# Patient Record
Sex: MALE | Race: WHITE | NOT HISPANIC OR LATINO | Employment: OTHER | URBAN - METROPOLITAN AREA
[De-identification: names, ages, dates, MRNs, and addresses within clinical notes are randomized per-mention and may not be internally consistent; named-entity substitution may affect disease eponyms.]

---

## 2017-01-04 ENCOUNTER — OFFICE VISIT (OUTPATIENT)
Dept: SURGERY | Facility: CLINIC | Age: 82
End: 2017-01-04

## 2017-01-04 VITALS
BODY MASS INDEX: 32.14 KG/M2 | WEIGHT: 200 LBS | DIASTOLIC BLOOD PRESSURE: 82 MMHG | HEIGHT: 66 IN | SYSTOLIC BLOOD PRESSURE: 132 MMHG

## 2017-01-04 DIAGNOSIS — C18.2 MALIGNANT NEOPLASM OF ASCENDING COLON (HCC): Primary | ICD-10-CM

## 2017-01-04 PROCEDURE — 99212 OFFICE O/P EST SF 10 MIN: CPT | Performed by: SURGERY

## 2017-01-04 NOTE — PROGRESS NOTES
8 MO FU COLON CA, 3 1/2 YEARS S/P COLECTOMY, CEA COMPLETE, PT IS W/O COMPLAINTS  Complaints.  He says that his bowel movements have been normal he denies any blood in the stool.  His weight has been stable.  He denies any respiratory complaints or any abdominal pain.  He is due for colonoscopy with Dr. Palomares in 6 months.  Physical exam is alert white male in no active distress she is obese his abdominal exam is soft and nontender without mass.  His liver was nonpalpable.  He has no supraclavicular adenopathy his lungs are clear and equal.  His CEA is elevated in the 3 range but it has stayed fairly steady in that range.  I would like to check another CEA in 6 months and see him back after his colonoscopy with Dr. Palomares.

## 2017-07-24 DIAGNOSIS — Z85.038 PERSONAL HISTORY OF COLON CANCER: Primary | ICD-10-CM

## 2017-08-09 ENCOUNTER — LAB (OUTPATIENT)
Dept: LAB | Facility: HOSPITAL | Age: 82
End: 2017-08-09
Attending: SURGERY

## 2017-08-09 DIAGNOSIS — Z85.038 PERSONAL HISTORY OF COLON CANCER: ICD-10-CM

## 2017-08-09 LAB — CEA SERPL-MCNC: 3.02 NG/ML

## 2017-08-09 PROCEDURE — 36415 COLL VENOUS BLD VENIPUNCTURE: CPT

## 2017-08-09 PROCEDURE — 82378 CARCINOEMBRYONIC ANTIGEN: CPT

## 2017-08-14 ENCOUNTER — OFFICE VISIT (OUTPATIENT)
Dept: SURGERY | Facility: CLINIC | Age: 82
End: 2017-08-14

## 2017-08-14 VITALS
HEIGHT: 66 IN | WEIGHT: 202.2 LBS | DIASTOLIC BLOOD PRESSURE: 84 MMHG | BODY MASS INDEX: 32.5 KG/M2 | SYSTOLIC BLOOD PRESSURE: 132 MMHG

## 2017-08-14 DIAGNOSIS — C18.9 MALIGNANT NEOPLASM OF COLON, UNSPECIFIED PART OF COLON (HCC): Primary | ICD-10-CM

## 2017-08-14 DIAGNOSIS — K40.90 UNILATERAL INGUINAL HERNIA WITHOUT OBSTRUCTION OR GANGRENE, RECURRENCE NOT SPECIFIED: ICD-10-CM

## 2017-08-14 PROCEDURE — 99212 OFFICE O/P EST SF 10 MIN: CPT | Performed by: SURGERY

## 2017-08-14 NOTE — PROGRESS NOTES
1 yr 1 mo f/u on colon cancer  He is 4 years status post colectomy for colon cancer.  He denies any blood in the stool.  He says that he has been checked with cards and that has always been negative.  His weight has been stable.  He denies a cough.  He denies any abdominal pain.  He denies any groin pain.  He did have a CT scan a year ago that showed a small left inguinal hernia.  His abdominal exam is benign and there is no mass.  His liver is nonpalpable.  He has no supraclavicular adenopathy.  I discussed the CT scan findings with him regarding the inguinal hernia.  I discussed symptoms of inguinal hernia with him in detail.  We will observe this.  He is due to have a colonoscopy in September and  asked that those results be sent to me.  His CEA is stable but still elevated in the 3 range and we will recheck that in 6 months  see him back in the office.

## 2017-09-14 ENCOUNTER — ANESTHESIA EVENT (OUTPATIENT)
Dept: PERIOP | Facility: HOSPITAL | Age: 82
End: 2017-09-14

## 2017-09-15 ENCOUNTER — ANESTHESIA (OUTPATIENT)
Dept: PERIOP | Facility: HOSPITAL | Age: 82
End: 2017-09-15

## 2017-09-15 ENCOUNTER — ON CAMPUS - OUTPATIENT (OUTPATIENT)
Dept: URBAN - METROPOLITAN AREA HOSPITAL 28 | Facility: HOSPITAL | Age: 82
End: 2017-09-15

## 2017-09-15 ENCOUNTER — HOSPITAL ENCOUNTER (OUTPATIENT)
Facility: HOSPITAL | Age: 82
Setting detail: HOSPITAL OUTPATIENT SURGERY
Discharge: HOME | End: 2017-09-15
Attending: INTERNAL MEDICINE | Admitting: INTERNAL MEDICINE

## 2017-09-15 ENCOUNTER — PREP FOR SURGERY (OUTPATIENT)
Dept: OTHER | Facility: HOSPITAL | Age: 82
End: 2017-09-15

## 2017-09-15 VITALS
HEART RATE: 56 BPM | TEMPERATURE: 97.4 F | SYSTOLIC BLOOD PRESSURE: 144 MMHG | WEIGHT: 196.8 LBS | OXYGEN SATURATION: 95 % | DIASTOLIC BLOOD PRESSURE: 72 MMHG | HEIGHT: 66 IN | RESPIRATION RATE: 16 BRPM | BODY MASS INDEX: 31.63 KG/M2

## 2017-09-15 DIAGNOSIS — Z85.038 PERSONAL HISTORY OF COLON CANCER: ICD-10-CM

## 2017-09-15 DIAGNOSIS — Z90.49 ACQUIRED ABSENCE OF OTHER SPECIFIED PARTS OF DIGESTIVE TRACT: ICD-10-CM

## 2017-09-15 DIAGNOSIS — Z85.038 PERSONAL HISTORY OF OTHER MALIGNANT NEOPLASM OF LARGE INTEST: ICD-10-CM

## 2017-09-15 DIAGNOSIS — Z98.890 OTHER SPECIFIED POSTPROCEDURAL STATES: ICD-10-CM

## 2017-09-15 DIAGNOSIS — K63.5 POLYP OF COLON: ICD-10-CM

## 2017-09-15 DIAGNOSIS — K62.1 RECTAL POLYP: ICD-10-CM

## 2017-09-15 LAB
GLUCOSE BLDC GLUCOMTR-MCNC: 87 MG/DL (ref 70–130)
POTASSIUM BLD-SCNC: 4.6 MMOL/L (ref 3.5–5.2)

## 2017-09-15 PROCEDURE — 84132 ASSAY OF SERUM POTASSIUM: CPT | Performed by: NURSE ANESTHETIST, CERTIFIED REGISTERED

## 2017-09-15 PROCEDURE — 82962 GLUCOSE BLOOD TEST: CPT

## 2017-09-15 PROCEDURE — 45380 COLONOSCOPY AND BIOPSY: CPT | Mod: PT

## 2017-09-15 PROCEDURE — 93010 ELECTROCARDIOGRAM REPORT: CPT | Performed by: INTERNAL MEDICINE

## 2017-09-15 PROCEDURE — 25010000002 PROPOFOL 10 MG/ML EMULSION: Performed by: NURSE ANESTHETIST, CERTIFIED REGISTERED

## 2017-09-15 PROCEDURE — 93005 ELECTROCARDIOGRAM TRACING: CPT | Performed by: NURSE ANESTHETIST, CERTIFIED REGISTERED

## 2017-09-15 RX ORDER — SODIUM CHLORIDE 0.9 % (FLUSH) 0.9 %
1-10 SYRINGE (ML) INJECTION AS NEEDED
Status: DISCONTINUED | OUTPATIENT
Start: 2017-09-15 | End: 2017-09-15 | Stop reason: HOSPADM

## 2017-09-15 RX ORDER — PROPOFOL 10 MG/ML
VIAL (ML) INTRAVENOUS AS NEEDED
Status: DISCONTINUED | OUTPATIENT
Start: 2017-09-15 | End: 2017-09-15 | Stop reason: SURG

## 2017-09-15 RX ORDER — SODIUM CHLORIDE, SODIUM LACTATE, POTASSIUM CHLORIDE, CALCIUM CHLORIDE 600; 310; 30; 20 MG/100ML; MG/100ML; MG/100ML; MG/100ML
100 INJECTION, SOLUTION INTRAVENOUS CONTINUOUS
Status: CANCELLED | OUTPATIENT
Start: 2017-09-15

## 2017-09-15 RX ORDER — ONDANSETRON 2 MG/ML
4 INJECTION INTRAMUSCULAR; INTRAVENOUS ONCE AS NEEDED
Status: CANCELLED | OUTPATIENT
Start: 2017-09-15

## 2017-09-15 RX ORDER — LIDOCAINE HYDROCHLORIDE 20 MG/ML
INJECTION, SOLUTION INFILTRATION; PERINEURAL AS NEEDED
Status: DISCONTINUED | OUTPATIENT
Start: 2017-09-15 | End: 2017-09-15 | Stop reason: SURG

## 2017-09-15 RX ORDER — LIDOCAINE HYDROCHLORIDE 10 MG/ML
0.5 INJECTION, SOLUTION EPIDURAL; INFILTRATION; INTRACAUDAL; PERINEURAL ONCE AS NEEDED
Status: DISCONTINUED | OUTPATIENT
Start: 2017-09-15 | End: 2017-09-15 | Stop reason: HOSPADM

## 2017-09-15 RX ORDER — SODIUM CHLORIDE, SODIUM LACTATE, POTASSIUM CHLORIDE, CALCIUM CHLORIDE 600; 310; 30; 20 MG/100ML; MG/100ML; MG/100ML; MG/100ML
9 INJECTION, SOLUTION INTRAVENOUS CONTINUOUS
Status: DISCONTINUED | OUTPATIENT
Start: 2017-09-15 | End: 2017-09-15 | Stop reason: HOSPADM

## 2017-09-15 RX ADMIN — SODIUM CHLORIDE, POTASSIUM CHLORIDE, SODIUM LACTATE AND CALCIUM CHLORIDE: 600; 310; 30; 20 INJECTION, SOLUTION INTRAVENOUS at 11:52

## 2017-09-15 RX ADMIN — LIDOCAINE HYDROCHLORIDE 100 MG: 20 INJECTION, SOLUTION INFILTRATION; PERINEURAL at 12:11

## 2017-09-15 RX ADMIN — PROPOFOL 180 MG: 10 INJECTION, EMULSION INTRAVENOUS at 12:11

## 2017-09-15 NOTE — H&P
"Patient Care Team:  Clem Zabala MD as PCP - General (Family Medicine)    CHIEF COMPLAINT: Previous CRC    HISTORY OF PRESENT ILLNESS:    Last exam was 2014 and resection was 2013      Past Medical History:   Diagnosis Date   • Arthritis    • Cancer     COLON CANCER   • Diabetes mellitus    • Hyperlipidemia    • Hypertension      Past Surgical History:   Procedure Laterality Date   • CHOLECYSTECTOMY     • COLONOSCOPY     • HEMICOLECTOMY     • SPINAL FUSION       History reviewed. No pertinent family history.  Social History   Substance Use Topics   • Smoking status: Former Smoker   • Smokeless tobacco: Never Used   • Alcohol use Yes      Comment: rarely     Prescriptions Prior to Admission   Medication Sig Dispense Refill Last Dose   • Multiple Vitamins-Minerals (MULTIVITAMIN ADULT PO) Take 1 tablet by mouth Daily.   9/10/2017   • aspirin tablet Take 81 mg by mouth Daily.   9/10/2017   • lisinopril-hydrochlorothiazide (PRINZIDE,ZESTORETIC) 10-12.5 MG per tablet Take 1 tablet by mouth Daily.   9/10/2017   • metFORMIN XR (GLUCOPHAGE-XR) 750 MG 24 hr tablet TAKE 1 TABLET BY MOUTH ONCE DAILY BEFORE SUPPER  0 9/10/2017   • pravastatin (PRAVACHOL) 40 MG tablet Take 40 mg by mouth Daily.   9/10/2017   • tamsulosin (FLOMAX) 0.4 MG capsule 24 hr capsule Take 1 capsule by mouth Daily.   9/10/2017     Allergies:  Review of patient's allergies indicates no known allergies.    REVIEW OF SYSTEMS:  Please see the above history of present illness for pertinent positives and negatives.  The remainder of the patient's systems have been reviewed and are negative.     Vital Signs  Temp:  [97.9 °F (36.6 °C)] 97.9 °F (36.6 °C)  Heart Rate:  [66] 66  Resp:  [16] 16  BP: (164)/(82) 164/82    Flowsheet Rows         First Filed Value    Admission Height  66\" (167.6 cm) Documented at 09/15/2017 1052    Admission Weight  196 lb 12.8 oz (89.3 kg) Documented at 09/15/2017 1052           Physical Exam:  Physical Exam "   Constitutional: Patient appears well-developed and well-nourished and in no acute distress   HEENT:   Head: Normocephalic and atraumatic.   Eyes:  Pupils are equal, round, and reactive to light. EOM are intact. Sclera are anicteric and non-injected.  Mouth and Throat: Patient has moist mucous membranes. Oropharynx is clear of any erythema or exudate.     Neck: Neck supple. No JVD present. No thyromegaly present. No lymphadenopathy present.  Cardiovascular: Regular rate, regular rhythm, S1 normal and S2 normal.  Exam reveals no gallop and no friction rub.  No murmur heard.  Pulmonary/Chest: Lungs are clear to auscultation bilaterally. No respiratory distress. No wheezes. No rhonchi. No rales.   Abdominal: Soft. Bowel sounds are normal. No distension and no mass. There is no hepatosplenomegaly. There is no tenderness.   Musculoskeletal: Normal Muscle tone  Extremities: No edema. Pulses are palpable in all 4 extremities.  Neurological: Patient is alert and oriented to person, place, and time. Cranial nerves II-XII are grossly intact with no focal deficits.  Skin: Skin is warm. No rash noted. Nails show no clubbing.  No cyanosis or erythema.     Results Review:    I reviewed the patient's new clinical results.  Lab Results (most recent)     Procedure Component Value Units Date/Time    POC Glucose Fingerstick [[id number]]  (Normal) Collected:  09/15/17 1105    Specimen:  Blood Updated:  09/15/17 1121     Glucose 87 mg/dL     Narrative:       Meter: [id number] : [id number] Heath Valdez RN    Potassium [[id number]]  (Normal) Collected:  09/15/17 1115    Specimen:  Blood from Arm, Right Updated:  09/15/17 1138     Potassium 4.6 mmol/L           Imaging Results (most recent)     None        reviewed    ECG/EMG Results (most recent)     Procedure Component Value Units Date/Time    ECG 12 Lead [[id number]] Collected:  09/15/17 1133     Updated:  09/15/17 1136    Narrative:       RR Interval= 968 ms  [place] Interval= 184  ms  QRSD Interval= 92 ms  QT Interval= 408 ms  QTc Interval= 415 ms  Heart Rate= 62 ms  P Axis= 0 deg  QRS Axis= -47 deg  T Wave Axis= 35 deg  I: 40 Axis= -41 deg  T: 40 Axis= -58 deg  ST Axis= -11 deg  SINUS RHYTHM  LAD, CONSIDER LAFB OR INFERIOR INFARCT  CONSIDER ANTEROSEPTAL INFARCT  Electronically Signed by:  Date and Time of Study: 2017-09-15 11:33:17        reviewed    Assessment/Plan     Personal history of CRC/ Colonoscopy    I discussed the patients findings and my recommendations with patient.     Tenzin Palomares MD  09/15/17  12:03 PM    Time: 10 min prior to procedure.

## 2017-09-15 NOTE — ANESTHESIA POSTPROCEDURE EVALUATION
Patient: Chip Hartley    Procedure Summary     Date Anesthesia Start Anesthesia Stop Room / Location    09/15/17 1204 1234 [facility] / [facility]       Procedure Diagnosis Surgeon Provider    COLONOSCOPY with polypectomy (N/A ) Colon polyps; S/P right hemicolectomy  (Z85.038) MD Taya Vasquez CRNA          Anesthesia Type: MAC  Last vitals  BP   146/72 (09/15/17 1237)    Temp   97.4 °F (36.3 °C) (09/15/17 1237)    Pulse   56 (09/15/17 1237)   Resp   16 (09/15/17 1237)    SpO2   96 % (09/15/17 1237)      Post Anesthesia Care and Evaluation    Patient location during evaluation: PHASE II  Patient participation: complete - patient participated  Level of consciousness: awake and alert  Pain score: 0  Pain management: adequate  Airway patency: patent  Anesthetic complications: No anesthetic complications  PONV Status: none  Cardiovascular status: acceptable  Respiratory status: acceptable  Hydration status: acceptable

## 2017-09-15 NOTE — OP NOTE
COLONOSCOPY  Procedure Report    Patient Name:  Chip Hartley  YOB: 1935    Date of Surgery:  9/15/2017     Indications:  Personal history of colon cancer    Pre-op Diagnosis:  Z85.038           Post-op Diagnosis:   Post-Op Diagnosis Codes:     * Colon polyps [K63.5]     * S/P right hemicolectomy [Z90.49]         Procedure/CPT® Codes:      Procedure(s):  COLONOSCOPY with polypectomy    Staff:  Surgeon(s):  Tenzin Palomares MD         Anesthesia: Monitor Anesthesia Care    Estimated Blood Loss: *No blood loss documented*    Implants:    Nothing was implanted during the procedure    Specimen:                  ID Type Source Tests Collected by Time Destination   A : cecal polyp Polyp Large Intestine, Cecum TISSUE EXAM Tenzin Palomares MD 9/15/2017 1220    B : rectal polyp x2 Tissue Large Intestine, Rectum TISSUE EXAM Tenzin Palomares MD 9/15/2017 1228        Description of Procedure: After having signed informed consent, he was brought to the endoscopy suite and placed in the left lateral decubitus position and given his IV sedation. Rectal exam revealed no external lesions, normal anal tone, and only mildly enlarged prostate without nodules. The scope was introduced in the rectum and advanced under direct visualization through a fairly well-prepped colon, through the rectum, sigmoid colon, descending colon, to and around the splenic flexure, reduced, and advanced through the transverse colon to the ileocolonic anastomosis. Within the surgically created cecum there was a granulation polyp associated with a staple. This was biopsied. It was 3 mm in size. It was successfully removed along with the clip. The scope was withdrawn from the ileocolonic anastomosis to the blind pouch, and the small bowel was entered and examined and normal appearing. The scope was then brought back into the afferent limb of the small bowel, which was normal as well. The scope was withdrawn back  "into the colon, withdrawn slowly to examine the colon in a circumferential fashion. There were no mucosal lesions noted throughout the remaining colon, with the exception of two 3-mm hyperplastic polyps noted in the rectum. They were both biopsied and sent together as rectal polyps x2. The scope was taken from the patient, who tolerated the procedure very well.           Findings:  [place] to Ileo-colonic Anastamosis  Polyps 3 \"cecal\" with staple-cold biopsy; Rectal x 2 3mm cold biopsy      Complications: none    Recommendations: Results to be called  Repeat in 5 years      Tenzin Palomares MD     Date: 9/15/2017  Time: 12:34 PM          "

## 2017-09-15 NOTE — PLAN OF CARE
Problem: Patient Care Overview (Adult)  Goal: Plan of Care Review  Outcome: Ongoing (interventions implemented as appropriate)    09/15/17 1126   Coping/Psychosocial Response Interventions   Plan Of Care Reviewed With patient;spouse   Patient Care Overview   Progress no change   Outcome Evaluation   Outcome Summary/Follow up Plan VSS, NO C/O, READY FOR PROCEDURE       Goal: Adult Individualization and Mutuality  Outcome: Ongoing (interventions implemented as appropriate)    Problem: GI Endoscopy (Adult)  Goal: Signs and Symptoms of Listed Potential Problems Will be Absent or Manageable (GI Endoscopy)  Outcome: Ongoing (interventions implemented as appropriate)

## 2017-09-15 NOTE — BRIEF OP NOTE
"COLONOSCOPY  Procedure Note    Chip Hartley  9/15/2017    Pre-op Diagnosis:   Z85.038    Post-op Diagnosis:     Post-Op Diagnosis Codes:     * Colon polyps [K63.5]     * S/P right hemicolectomy [Z90.49]    Procedure/CPT® Codes:      Procedure(s):  COLONOSCOPY with polypectomy    Surgeon(s):  Tenzin Palomares MD    Anesthesia: Monitor Anesthesia Care    Staff:   Circulator: Maya Bruce RN  Scrub Person: Sonia Krishna    Estimated Blood Loss: *No blood loss documented*  Urine Voided: * No values recorded between 9/15/2017 12:01 PM and 9/15/2017 12:30 PM *    Specimens:                  ID Type Source Tests Collected by Time Destination   A : cecal polyp Polyp Large Intestine, Cecum TISSUE EXAM Tenzin Palomares MD 9/15/2017 1220    B : rectal polyp x2 Tissue Large Intestine, Rectum TISSUE EXAM Tenzin Palomares MD 9/15/2017 1228          Drains:           Findings: [place] to Ileo-colonic Anastamosis  Polyps 3 \"cecal\" with staple-cold biopsy; Rectal x 2 3mm cold biopsy    Complications: none      Tenzin Palomares MD     Date: 9/15/2017  Time: 12:33 PM      "

## 2017-09-15 NOTE — ANESTHESIA PREPROCEDURE EVALUATION
Anesthesia Evaluation     Patient summary reviewed and Nursing notes reviewed   history of anesthetic complications:  NPO Solid Status: > 8 hours  NPO Liquid Status: > 8 hours     Airway   Mallampati: III  Neck ROM: full  possible difficult intubation and small opening  Dental - normal exam     Pulmonary - negative pulmonary ROS    breath sounds clear to auscultation  Cardiovascular - normal exam  Exercise tolerance: poor (<4 METS)    ECG reviewed  Rhythm: regular  Rate: normal    (+) hypertension well controlled, hyperlipidemia    ROS comment: SINUS RHYTHM  LAD, CONSIDER LAFB OR INFERIOR INFARCT  CONSIDER ANTEROSEPTAL INFARCT    Neuro/Psych    ROS Comment: [place]  GI/Hepatic/Renal/Endo    (+) obesity,  diabetes mellitus type 2 well controlled,     Musculoskeletal     Abdominal   (+) obese,    Substance History   (+) alcohol use (rarely),      OB/GYN          Other   (+) arthritis   history of cancer (colon)                                  Anesthesia Plan    ASA 2     MAC     Anesthetic plan and risks discussed with patient.  Use of blood products discussed with patient  Consented to blood products.

## 2017-09-15 NOTE — PLAN OF CARE
Problem: GI Endoscopy (Adult)  Goal: Signs and Symptoms of Listed Potential Problems Will be Absent or Manageable (GI Endoscopy)  Outcome: Ongoing (interventions implemented as appropriate)    09/15/17 1210   GI Endoscopy   Problems Assessed (GI Endoscopy) all   Problems Present (GI Endoscopy) none

## 2017-09-19 LAB
LAB AP CASE REPORT: NORMAL
Lab: NORMAL
PATH REPORT.FINAL DX SPEC: NORMAL

## 2018-01-09 DIAGNOSIS — Z85.038 HISTORY OF COLON CANCER: Primary | ICD-10-CM

## 2018-01-26 ENCOUNTER — LAB (OUTPATIENT)
Dept: LAB | Facility: HOSPITAL | Age: 83
End: 2018-01-26
Attending: SURGERY

## 2018-01-26 DIAGNOSIS — Z85.038 HISTORY OF COLON CANCER: ICD-10-CM

## 2018-01-26 LAB — CEA SERPL-MCNC: 3.2 NG/ML

## 2018-01-26 PROCEDURE — 36415 COLL VENOUS BLD VENIPUNCTURE: CPT

## 2018-01-26 PROCEDURE — 82378 CARCINOEMBRYONIC ANTIGEN: CPT

## 2018-01-29 ENCOUNTER — OFFICE VISIT (OUTPATIENT)
Dept: SURGERY | Facility: CLINIC | Age: 83
End: 2018-01-29

## 2018-01-29 VITALS
DIASTOLIC BLOOD PRESSURE: 80 MMHG | HEIGHT: 66 IN | SYSTOLIC BLOOD PRESSURE: 148 MMHG | BODY MASS INDEX: 33.11 KG/M2 | WEIGHT: 206 LBS

## 2018-01-29 DIAGNOSIS — C18.2 MALIGNANT NEOPLASM OF ASCENDING COLON (HCC): Primary | ICD-10-CM

## 2018-01-29 PROCEDURE — 99212 OFFICE O/P EST SF 10 MIN: CPT | Performed by: SURGERY

## 2018-01-29 NOTE — PROGRESS NOTES
8 MO F/U COLON CA, ~ 4 1/2 years status post colectomy for colon cancer, PT IS W/O COMPLAINTS  He is without complaints.  He has not lost any weight.  He denies a cough.  Denies any abdominal pain or blood in stool.  He had a colonoscopy by Dr. Palomares several months ago that showed several hyperplastic polyps.  He have a repeat colonoscopy in 5 years.  His CEA level is still does mildly elevated but not significantly different over the last year and a half.  He has no supraclavicular or groin adenopathy.  His lungs are clear and equal.  His heart shows a regular rate and rhythm.  His abdominal exam is benign.  There is no incisional hernia.  I would like to see him back in 1 year and I don't feel like we need to repeat his CEA because it hasn't significantly changed.

## 2019-01-15 ENCOUNTER — OFFICE VISIT (OUTPATIENT)
Dept: SURGERY | Facility: CLINIC | Age: 84
End: 2019-01-15

## 2019-01-15 VITALS
RESPIRATION RATE: 18 BRPM | WEIGHT: 201 LBS | BODY MASS INDEX: 32.3 KG/M2 | HEIGHT: 66 IN | SYSTOLIC BLOOD PRESSURE: 132 MMHG | DIASTOLIC BLOOD PRESSURE: 74 MMHG

## 2019-01-15 DIAGNOSIS — C18.2 MALIGNANT NEOPLASM OF ASCENDING COLON (HCC): ICD-10-CM

## 2019-01-15 DIAGNOSIS — Z85.038 HISTORY OF COLON CANCER: Primary | ICD-10-CM

## 2019-01-15 PROCEDURE — 99212 OFFICE O/P EST SF 10 MIN: CPT | Performed by: SURGERY

## 2019-01-15 NOTE — PROGRESS NOTES
FU - 5 years status post colectomy for colon cancer, PT IS W/O COMPLAINTS  Is 5 and half years status post colectomy for colon cancer.  Says he feels well.  He did have a colonoscopy that showed some benign polyps a year and a half ago.  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  He denies any abdominal pain says he has the usual cough with allergies but nothing chronic.  His weight has been stable.  He denies any abdominal pain.  Alert overweight white male in no active distress he is no supraclavicular nor groin adenopathy.  His lungs are clear and equal was heart shows a regular rate and rhythm his abdomen soft and nontender without mass or impulse.  I see no evidence of recurrent disease I would like to check stool for Hemoccult ×3 and I will see him back in 1 year

## 2019-08-22 ENCOUNTER — TRANSCRIBE ORDERS (OUTPATIENT)
Dept: ADMINISTRATIVE | Facility: HOSPITAL | Age: 84
End: 2019-08-22

## 2019-08-22 DIAGNOSIS — I71.21 ANEURYSM OF ASCENDING AORTA (HCC): Primary | ICD-10-CM

## 2019-08-28 ENCOUNTER — HOSPITAL ENCOUNTER (OUTPATIENT)
Dept: CT IMAGING | Facility: HOSPITAL | Age: 84
Discharge: HOME | End: 2019-08-28
Admitting: FAMILY MEDICINE

## 2019-08-28 DIAGNOSIS — I71.21 ANEURYSM OF ASCENDING AORTA (HCC): ICD-10-CM

## 2019-08-28 PROCEDURE — 71250 CT THORAX DX C-: CPT

## 2020-01-21 ENCOUNTER — OFFICE VISIT (OUTPATIENT)
Dept: SURGERY | Facility: CLINIC | Age: 85
End: 2020-01-21

## 2020-01-21 VITALS
WEIGHT: 193 LBS | DIASTOLIC BLOOD PRESSURE: 76 MMHG | SYSTOLIC BLOOD PRESSURE: 132 MMHG | HEIGHT: 66 IN | BODY MASS INDEX: 31.02 KG/M2 | RESPIRATION RATE: 18 BRPM

## 2020-01-21 DIAGNOSIS — Z85.038 HISTORY OF COLON CANCER: Primary | ICD-10-CM

## 2020-01-21 PROCEDURE — 99212 OFFICE O/P EST SF 10 MIN: CPT | Performed by: SURGERY

## 2020-01-21 NOTE — PROGRESS NOTES
Subjective   Chip Hartley is a 84 y.o. male here for   Chief Complaint   Patient presents with   • Follow-up   FU - 6 yr s/p colectomy for colon cancer    History of Present Illness he is 6 years status post colectomy for colon cancer.  He denies any problems.  He says his weight is stable he denies any blood in the stool or changes in his bowel movements.  He denies any abdominal pain.  He only has a cough during allergy season.  He had a colonoscopy with Dr. Palomares who removed several benign polyps 2 years ago.  Another colonoscopy was recommended in 5 years from the last study.    The following portions of the patient's history were reviewed and updated as appropriate: allergies, current medications, past family history, past medical history, past social history, past surgical history and problem list.    Past Medical History:   Diagnosis Date   • Arthritis    • Cancer (CMS/HCC)     COLON CANCER   • Diabetes mellitus (CMS/HCC)    • Hyperlipidemia    • Hypertension        Past Surgical History:   Procedure Laterality Date   • CHOLECYSTECTOMY     • COLONOSCOPY     • COLONOSCOPY N/A 9/15/2017    Procedure: COLONOSCOPY with polypectomy;  Surgeon: Tenzin Palomares MD;  Location: [facility];  Service:    • HEMICOLECTOMY     • SPINAL FUSION         History reviewed. No pertinent family history.    Social History     Socioeconomic History   • Marital status:      Spouse name: Not on file   • Number of children: Not on file   • Years of education: Not on file   • Highest education level: Not on file   Tobacco Use   • Smoking status: Former Smoker   • Smokeless tobacco: Never Used   Substance and Sexual Activity   • Alcohol use: Yes     Comment: rarely   • Drug use: No   • Sexual activity: Defer       Current Outpatient Medications on File Prior to Visit   Medication Sig Dispense Refill   • aspirin tablet Take 81 mg by mouth Daily.     • lisinopril-hydrochlorothiazide (PRINZIDE,ZESTORETIC) 10-12.5 MG  per tablet Take 1 tablet by mouth Daily.     • metFORMIN XR (GLUCOPHAGE-XR) 750 MG 24 hr tablet TAKE 1 TABLET BY MOUTH ONCE DAILY BEFORE SUPPER  0   • Multiple Vitamins-Minerals (MULTIVITAMIN ADULT PO) Take 1 tablet by mouth Daily.     • pravastatin (PRAVACHOL) 40 MG tablet Take 40 mg by mouth Daily.     • [DISCONTINUED] tamsulosin (FLOMAX) 0.4 MG capsule 24 hr capsule Take 1 capsule by mouth Daily.       No current facility-administered medications on file prior to visit.          Review of Systems  All other organ systems reviewed and are negative    Objective   Physical Exam alert overweight white male in no active distress he is oriented x3 his gait is normal he has no supraclavicular nor inguinal adenopathy.  His lungs are clear and equal his abdomen is soft and nontender with some asymmetrical bulge on his right versus his left likely secondary to his [place] incision.  There is no impulse there is no hernia his liver was nonpalpable his abdomen was nontender              Assessment/Plan   Colon cancer I see no evidence of recurrent disease.  We will check stool for Hemoccult x3.  I will see him back in 1 year.  I have recommended that he proceed with a colonoscopy 5 years after his last study.

## 2020-01-28 ENCOUNTER — APPOINTMENT (OUTPATIENT)
Dept: LAB | Facility: HOSPITAL | Age: 85
End: 2020-01-28

## 2020-01-28 DIAGNOSIS — Z85.038 PERSONAL HISTORY OF COLON CANCER: Primary | ICD-10-CM

## 2020-01-28 LAB — HEMOCCULT STL QL: NEGATIVE

## 2020-01-28 PROCEDURE — 82270 OCCULT BLOOD FECES: CPT

## 2020-08-11 ENCOUNTER — TRANSCRIBE ORDERS (OUTPATIENT)
Dept: ADMINISTRATIVE | Facility: HOSPITAL | Age: 85
End: 2020-08-11

## 2020-08-11 DIAGNOSIS — I71.21 ANEURYSM OF ASCENDING AORTA (HCC): Primary | ICD-10-CM

## 2020-09-04 ENCOUNTER — HOSPITAL ENCOUNTER (OUTPATIENT)
Dept: CT IMAGING | Facility: HOSPITAL | Age: 85
Discharge: HOME | End: 2020-09-04
Admitting: FAMILY MEDICINE

## 2020-09-04 DIAGNOSIS — I71.21 ANEURYSM OF ASCENDING AORTA (HCC): ICD-10-CM

## 2020-09-04 PROCEDURE — 71250 CT THORAX DX C-: CPT

## 2021-11-18 ENCOUNTER — HOSPITAL ENCOUNTER (OUTPATIENT)
Dept: GENERAL RADIOLOGY | Facility: HOSPITAL | Age: 86
Discharge: HOME | End: 2021-11-18

## 2021-11-18 ENCOUNTER — TRANSCRIBE ORDERS (OUTPATIENT)
Dept: ADMINISTRATIVE | Facility: HOSPITAL | Age: 86
End: 2021-11-18

## 2021-11-18 DIAGNOSIS — M25.559 HIP PAIN: Primary | ICD-10-CM

## 2021-11-18 DIAGNOSIS — M54.9 BACK PAIN, UNSPECIFIED BACK LOCATION, UNSPECIFIED BACK PAIN LATERALITY, UNSPECIFIED CHRONICITY: ICD-10-CM

## 2021-11-18 PROCEDURE — 73502 X-RAY EXAM HIP UNI 2-3 VIEWS: CPT

## 2021-11-18 PROCEDURE — 72100 X-RAY EXAM L-S SPINE 2/3 VWS: CPT

## 2021-11-22 ENCOUNTER — TRANSCRIBE ORDERS (OUTPATIENT)
Dept: MRI IMAGING | Facility: HOSPITAL | Age: 86
End: 2021-11-22

## 2021-11-22 ENCOUNTER — TRANSCRIBE ORDERS (OUTPATIENT)
Dept: ADMINISTRATIVE | Facility: HOSPITAL | Age: 86
End: 2021-11-22

## 2021-11-22 DIAGNOSIS — M54.31 SCIATICA OF RIGHT SIDE: Primary | ICD-10-CM

## 2021-11-22 DIAGNOSIS — M16.11 OSTEOARTHRITIS OF RIGHT HIP, UNSPECIFIED OSTEOARTHRITIS TYPE: ICD-10-CM

## 2021-12-13 ENCOUNTER — HOSPITAL ENCOUNTER (OUTPATIENT)
Dept: MRI IMAGING | Facility: HOSPITAL | Age: 86
Discharge: HOME | End: 2021-12-13

## 2021-12-13 DIAGNOSIS — M16.11 OSTEOARTHRITIS OF RIGHT HIP, UNSPECIFIED OSTEOARTHRITIS TYPE: ICD-10-CM

## 2021-12-13 DIAGNOSIS — M54.31 SCIATICA OF RIGHT SIDE: ICD-10-CM

## 2021-12-13 PROCEDURE — 72148 MRI LUMBAR SPINE W/O DYE: CPT

## 2021-12-13 PROCEDURE — 73721 MRI JNT OF LWR EXTRE W/O DYE: CPT

## 2022-09-19 ENCOUNTER — TRANSCRIBE ORDERS (OUTPATIENT)
Dept: CT IMAGING | Facility: HOSPITAL | Age: 87
End: 2022-09-19

## 2022-09-19 DIAGNOSIS — I71.21 ANEURYSM OF ASCENDING AORTA: Primary | ICD-10-CM

## 2022-09-28 ENCOUNTER — HOSPITAL ENCOUNTER (OUTPATIENT)
Dept: CT IMAGING | Facility: HOSPITAL | Age: 87
Discharge: HOME | End: 2022-09-28
Admitting: FAMILY MEDICINE

## 2022-09-28 DIAGNOSIS — I71.21 ANEURYSM OF ASCENDING AORTA: ICD-10-CM

## 2022-09-28 PROCEDURE — 71250 CT THORAX DX C-: CPT

## 2024-07-12 ENCOUNTER — TRANSCRIBE ORDERS (OUTPATIENT)
Dept: ADMINISTRATIVE | Facility: HOSPITAL | Age: 89
End: 2024-07-12
Payer: MEDICARE

## 2024-07-12 DIAGNOSIS — I71.21 ANEURYSM OF THE ASCENDING AORTA, WITHOUT RUPTURE: Primary | ICD-10-CM

## 2024-11-01 ENCOUNTER — HOSPITAL ENCOUNTER (OUTPATIENT)
Dept: CT IMAGING | Facility: HOSPITAL | Age: 89
Discharge: HOME | End: 2024-11-01
Payer: MEDICARE

## 2024-11-01 DIAGNOSIS — I71.21 ANEURYSM OF THE ASCENDING AORTA, WITHOUT RUPTURE: ICD-10-CM

## 2024-11-01 PROCEDURE — 71250 CT THORAX DX C-: CPT

## 2025-01-27 ENCOUNTER — TRANSCRIBE ORDERS (OUTPATIENT)
Dept: ADMINISTRATIVE | Facility: HOSPITAL | Age: 89
End: 2025-01-27
Payer: MEDICARE

## 2025-01-27 DIAGNOSIS — M43.16 SPONDYLOLISTHESIS, LUMBAR REGION: Primary | ICD-10-CM

## 2025-01-27 DIAGNOSIS — R55 SYNCOPE AND COLLAPSE: ICD-10-CM

## 2025-02-03 ENCOUNTER — HOSPITAL ENCOUNTER (OUTPATIENT)
Dept: MRI IMAGING | Facility: HOSPITAL | Age: 89
Discharge: HOME | End: 2025-02-03
Payer: MEDICARE

## 2025-02-03 DIAGNOSIS — R55 SYNCOPE AND COLLAPSE: ICD-10-CM

## 2025-02-03 DIAGNOSIS — M43.16 SPONDYLOLISTHESIS, LUMBAR REGION: ICD-10-CM

## 2025-02-03 PROCEDURE — 72148 MRI LUMBAR SPINE W/O DYE: CPT

## 2025-02-03 PROCEDURE — 70551 MRI BRAIN STEM W/O DYE: CPT

## (undated) DEVICE — SUCTION CANISTER, 3000CC,SAFELINER: Brand: DEROYAL

## (undated) DEVICE — SPNG GZ WOVN 4X4IN 12PLY 10/BX STRL

## (undated) DEVICE — VIAL FORMALIN CAP 10P 40ML

## (undated) DEVICE — JACKT LAB F/R KNIT CUFF/COLR XLG BLU

## (undated) DEVICE — MASK,FACE,SHIELD,BLUE,ANTI FOG,TIES: Brand: MEDLINE

## (undated) DEVICE — GOWN ISOL W/THUMB UNIV BLU BX/15

## (undated) DEVICE — SYR LL 3CC

## (undated) DEVICE — FRCP BX RADJAW4 NDL 2.8 240CM LG OG BX40

## (undated) DEVICE — GLV SURG NEOLON 2G PF LF 7.5 STRL

## (undated) DEVICE — Device

## (undated) DEVICE — BW-412T DISP COMBO CLEANING BRUSH: Brand: SINGLE USE COMBINATION CLEANING BRUSH

## (undated) DEVICE — Device: Brand: DEFENDO AIR/WATER/SUCTION AND BIOPSY VALVE